# Patient Record
Sex: MALE | Race: WHITE | Employment: FULL TIME | URBAN - METROPOLITAN AREA
[De-identification: names, ages, dates, MRNs, and addresses within clinical notes are randomized per-mention and may not be internally consistent; named-entity substitution may affect disease eponyms.]

---

## 2017-04-07 PROBLEM — Z72.51 HIGH RISK SEXUAL BEHAVIOR: Status: ACTIVE | Noted: 2017-04-07

## 2017-05-08 PROCEDURE — 86694 HERPES SIMPLEX NES ANTBDY: CPT | Performed by: UROLOGY

## 2017-05-08 PROCEDURE — 36415 COLL VENOUS BLD VENIPUNCTURE: CPT | Performed by: UROLOGY

## 2017-05-08 PROCEDURE — 86695 HERPES SIMPLEX TYPE 1 TEST: CPT | Performed by: UROLOGY

## 2017-05-08 PROCEDURE — 86696 HERPES SIMPLEX TYPE 2 TEST: CPT | Performed by: UROLOGY

## 2017-05-08 PROCEDURE — 87389 HIV-1 AG W/HIV-1&-2 AB AG IA: CPT | Performed by: UROLOGY

## 2017-05-23 PROBLEM — J30.1 SEASONAL ALLERGIC RHINITIS DUE TO POLLEN: Status: ACTIVE | Noted: 2017-05-23

## 2017-05-23 PROBLEM — R09.82 POST-NASAL DRAINAGE: Status: ACTIVE | Noted: 2017-05-23

## 2017-06-01 PROCEDURE — 36415 COLL VENOUS BLD VENIPUNCTURE: CPT | Performed by: UROLOGY

## 2017-06-01 PROCEDURE — 87389 HIV-1 AG W/HIV-1&-2 AB AG IA: CPT | Performed by: UROLOGY

## 2017-07-14 PROBLEM — K14.6 BURNING TONGUE: Status: ACTIVE | Noted: 2017-07-14

## 2017-07-27 PROCEDURE — 87389 HIV-1 AG W/HIV-1&-2 AB AG IA: CPT | Performed by: INTERNAL MEDICINE

## 2017-07-27 PROCEDURE — 36415 COLL VENOUS BLD VENIPUNCTURE: CPT | Performed by: INTERNAL MEDICINE

## 2019-05-02 ENCOUNTER — OFFICE VISIT (OUTPATIENT)
Dept: SURGERY | Facility: CLINIC | Age: 34
End: 2019-05-02
Payer: COMMERCIAL

## 2019-05-02 VITALS
HEART RATE: 87 BPM | SYSTOLIC BLOOD PRESSURE: 150 MMHG | BODY MASS INDEX: 25 KG/M2 | WEIGHT: 192 LBS | DIASTOLIC BLOOD PRESSURE: 85 MMHG

## 2019-05-02 DIAGNOSIS — L29.0 PRURITUS ANI: ICD-10-CM

## 2019-05-02 DIAGNOSIS — K64.2 GRADE III INTERNAL HEMORRHOIDS: Primary | ICD-10-CM

## 2019-05-02 DIAGNOSIS — K51.919 ULCERATIVE COLITIS WITH COMPLICATION, UNSPECIFIED LOCATION (HCC): ICD-10-CM

## 2019-05-02 PROCEDURE — 99244 OFF/OP CNSLTJ NEW/EST MOD 40: CPT | Performed by: COLON & RECTAL SURGERY

## 2019-05-02 PROCEDURE — 46600 DIAGNOSTIC ANOSCOPY SPX: CPT | Performed by: COLON & RECTAL SURGERY

## 2019-05-02 NOTE — PATIENT INSTRUCTIONS
The patient presents today referred by Dr. Stephanie Jett for approximately 1 year history of rectal itching and pain.     The patient states that approximately 1 year ago the patient began having on and off itching, episodes of bleeding after bowel movements on th discussed with the patient and it was emphasized that he should avoid any coffee, cola, beer, spicy foods, or tomato products. All risks, benefits, complications and alternatives to the proposed operation were fully discussed with the patient.  All quest

## 2019-05-02 NOTE — H&P
New Patient Visit Note       Active Problems      1. Grade III internal hemorrhoids    2. Ulcerative colitis with complication, unspecified location ([facility] [address]    3.  Pruritus ani        Chief Complaint   Patient presents with:  Hemorrhoids: NP HEMORRHOIDS- appe my own physical exam and obtained the history as detailed above.   I have made all appropriate changes in documentation as necessary  I attest to the accuracy of this note as detailed above    MD Maria Fernanda Gautam. Sabrina 46    My total face time with this MG Oral Tablet 24 Hr, TAKE 2 TABLETS BY MOUTH EVERY MORNING, Disp: , Rfl: 6  •  LIALDA 1.2 g Oral Tab EC, TAKE 2 TABLETS BY MOUTH EVERY DAY ** NEEDS TO SEE MD FOR FURTHER REFILLS, Disp: 60 tablet, Rfl: 0  •  FOLIC ACID 1 MG Oral Tab, TAKE 1 TABLET BY MOUTH Trachea normal and normal range of motion. Carotid bruit is not present. No tracheal deviation present. No thyroid mass and no thyromegaly present. Cardiovascular: Normal rate, regular rhythm, S1 normal, S2 normal and normal heart sounds.    No murmur hea and no posterior cervical adenopathy present. Right: No inguinal and no supraclavicular adenopathy present. Left: No inguinal and no supraclavicular adenopathy present. Neurological: He is alert and oriented to person, place, and time.    Sk lichenification. There are no external hemorrhoids, fissures, or ulcerations. Digital rectal exam the patient is a normal size prostate. The patient has circumferential grade 3 internal hemorrhoids.   There is no evidence of any proctitis, ulcerative col

## 2019-05-02 NOTE — PROCEDURES
Procedure:  Anoscopy    Surgeon: Anastasia Bran    Anesthesia: None    Findings: See the progress note attached for all findings    Operative Summary: The patient was placed in a prone position on the proctoscopy table, the hips were flexed in the jackknife p

## 2019-05-20 ENCOUNTER — OFFICE VISIT (OUTPATIENT)
Dept: SURGERY | Facility: CLINIC | Age: 34
End: 2019-05-20
Payer: COMMERCIAL

## 2019-05-20 VITALS
SYSTOLIC BLOOD PRESSURE: 148 MMHG | WEIGHT: 192 LBS | BODY MASS INDEX: 25.45 KG/M2 | TEMPERATURE: 98 F | HEART RATE: 78 BPM | HEIGHT: 73 IN | DIASTOLIC BLOOD PRESSURE: 87 MMHG

## 2019-05-20 DIAGNOSIS — K64.2 GRADE III INTERNAL HEMORRHOIDS: Primary | ICD-10-CM

## 2019-05-20 PROCEDURE — 46221 LIGATION OF HEMORRHOID(S): CPT | Performed by: COLON & RECTAL SURGERY

## 2019-05-20 NOTE — PROCEDURES
Pre op diagnosis: Internal Hemorrhoids    Post op diagnosis: Same    Procedure: Anoscopy with O-ring Rubber Band Ligation of Internal Hemorrhoids    Surgeon: Michelle Rincon MD    History of present illness:    This patient has internal hemorrhoids that are s

## 2019-05-20 NOTE — PATIENT INSTRUCTIONS
At today's office visit we treated right ethan-lateral internal hemorrhoid. At today's visit, the patient underwent an uncomplicated application of a rubber band and injection of a 5% phenol solution into the base of the rubberbanded hemorrhoid.     The

## 2019-05-20 NOTE — PROGRESS NOTES
Follow Up Visit Note       Active Problems      1. Grade III internal hemorrhoids          Chief Complaint   Patient presents with:  Hemorrhoids: 1ST BANDING        History of Present Illness        Allergies  Evie Bryson is allergic to no known allergies.     Pas FOLIC ACID 1 MG Oral Tab, TAKE 1 TABLET BY MOUTH EVERY DAY, Disp: 30 tablet, Rfl: 2  •  OMEPRAZOLE 40 MG Oral Capsule Delayed Release, TAKE 1 CAPSULE (40 MG TOTAL) BY MOUTH DAILY.  TAKE 1/2 HOUR BEFORE BREAKFAST, Disp: 30 capsule, Rfl: 0  •  Levocetirizine recover in my office for a few minutes prior to leaving for home. The patient should refrain from extreme sports or athletic activity, including heavy lifting. We will see this patient again in 2-3 weeks for further care and treatment.            No o

## 2019-06-03 ENCOUNTER — OFFICE VISIT (OUTPATIENT)
Dept: SURGERY | Facility: CLINIC | Age: 34
End: 2019-06-03
Payer: COMMERCIAL

## 2019-06-03 VITALS
SYSTOLIC BLOOD PRESSURE: 136 MMHG | WEIGHT: 185 LBS | HEART RATE: 82 BPM | DIASTOLIC BLOOD PRESSURE: 85 MMHG | HEIGHT: 73 IN | TEMPERATURE: 98 F | BODY MASS INDEX: 24.52 KG/M2

## 2019-06-03 DIAGNOSIS — K64.2 GRADE III INTERNAL HEMORRHOIDS: Primary | ICD-10-CM

## 2019-06-03 PROCEDURE — 46221 LIGATION OF HEMORRHOID(S): CPT | Performed by: COLON & RECTAL SURGERY

## 2019-06-03 NOTE — PROGRESS NOTES
Follow Up Visit Note       Active Problems      1. Grade III internal hemorrhoids          Chief Complaint   Patient presents with:  Hemorrhoid BandinND BANDING-- Denies constipation/diahrrea, n/v, fever or chills. Denies rectal bleeding after BMs.  Benita Tyler MORNING, Disp: , Rfl: 6  •  LIALDA 1.2 g Oral Tab EC, TAKE 2 TABLETS BY MOUTH EVERY DAY ** NEEDS TO SEE MD FOR FURTHER REFILLS, Disp: 60 tablet, Rfl: 0  •  FOLIC ACID 1 MG Oral Tab, TAKE 1 TABLET BY MOUTH EVERY DAY, Disp: 30 tablet, Rfl: 2  •  OMEPRAZOLE 4 hemorrhoid. The patient tolerated the procedure well. The patient remained stable throughout the entire procedure within my office. The patient was asked to recover in my office for a few minutes prior to leaving for home.     The patient should refr

## 2019-06-03 NOTE — PROCEDURES
Pre op diagnosis: Internal Hemorrhoids    Post op diagnosis: Same    Procedure: Anoscopy with O-ring Rubber Band Ligation of Internal Hemorrhoids    Surgeon: Enzo Morris MD    History of present illness:    This patient has internal hemorrhoids that are s

## 2020-02-17 PROBLEM — K51.90 ULCERATIVE COLITIS, UNSPECIFIED, WITHOUT COMPLICATIONS (HCC): Status: ACTIVE | Noted: 2020-02-17

## 2022-05-31 PROBLEM — Z72.51 HIGH RISK SEXUAL BEHAVIOR: Status: RESOLVED | Noted: 2017-04-07 | Resolved: 2022-05-31

## 2022-06-15 NOTE — LETTER
19    Patient: Andrea Benavides  : 1985 Visit date: 2019    Dear  Dr. Lanre Watts MD,    Thank you for referring Adriánvidal Troncosomarychuy to my practice. Please find my assessment and plan below.          Assessment   Grade III internal hemorrhoids  (p
175.26

## 2022-07-10 ENCOUNTER — APPOINTMENT (OUTPATIENT)
Dept: GENERAL RADIOLOGY | Facility: HOSPITAL | Age: 37
End: 2022-07-10
Attending: EMERGENCY MEDICINE
Payer: COMMERCIAL

## 2022-07-10 ENCOUNTER — HOSPITAL ENCOUNTER (EMERGENCY)
Facility: HOSPITAL | Age: 37
Discharge: HOME | End: 2022-07-10
Attending: EMERGENCY MEDICINE
Payer: COMMERCIAL

## 2022-07-10 VITALS
DIASTOLIC BLOOD PRESSURE: 97 MMHG | TEMPERATURE: 99 F | HEART RATE: 97 BPM | HEIGHT: 73 IN | SYSTOLIC BLOOD PRESSURE: 145 MMHG | RESPIRATION RATE: 16 BRPM | BODY MASS INDEX: 25.18 KG/M2 | OXYGEN SATURATION: 98 % | WEIGHT: 190 LBS

## 2022-07-10 DIAGNOSIS — K62.89 RECTAL IRRITATION: Primary | ICD-10-CM

## 2022-07-10 LAB
ALBUMIN SERPL-MCNC: 3.8 G/DL (ref 3.4–5)
ALBUMIN/GLOB SERPL: 1.1 {RATIO} (ref 1–2)
ALP LIVER SERPL-CCNC: 61 U/L
ALT SERPL-CCNC: 18 U/L
ANION GAP SERPL CALC-SCNC: 7 MMOL/L (ref 0–18)
AST SERPL-CCNC: 17 U/L (ref 15–37)
BASOPHILS # BLD AUTO: 0.07 X10(3) UL (ref 0–0.2)
BASOPHILS NFR BLD AUTO: 0.9 %
BILIRUB SERPL-MCNC: 0.5 MG/DL (ref 0.1–2)
BILIRUB UR QL STRIP.AUTO: NEGATIVE
BUN BLD-MCNC: 17 MG/DL (ref 7–18)
CALCIUM BLD-MCNC: 8.8 MG/DL (ref 8.5–10.1)
CHLORIDE SERPL-SCNC: 106 MMOL/L (ref 98–112)
CLARITY UR REFRACT.AUTO: CLEAR
CO2 SERPL-SCNC: 29 MMOL/L (ref 21–32)
COLOR UR AUTO: YELLOW
CREAT BLD-MCNC: 1.37 MG/DL
EOSINOPHIL # BLD AUTO: 0.48 X10(3) UL (ref 0–0.7)
EOSINOPHIL NFR BLD AUTO: 6.3 %
ERYTHROCYTE [DISTWIDTH] IN BLOOD BY AUTOMATED COUNT: 12.1 %
GLOBULIN PLAS-MCNC: 3.4 G/DL (ref 2.8–4.4)
GLUCOSE BLD-MCNC: 108 MG/DL (ref 70–99)
GLUCOSE UR STRIP.AUTO-MCNC: NEGATIVE MG/DL
HCT VFR BLD AUTO: 45.4 %
HGB BLD-MCNC: 15.6 G/DL
IMM GRANULOCYTES # BLD AUTO: 0.01 X10(3) UL (ref 0–1)
IMM GRANULOCYTES NFR BLD: 0.1 %
LEUKOCYTE ESTERASE UR QL STRIP.AUTO: NEGATIVE
LYMPHOCYTES # BLD AUTO: 3.49 X10(3) UL (ref 1–4)
LYMPHOCYTES NFR BLD AUTO: 46 %
MCH RBC QN AUTO: 30.8 PG (ref 26–34)
MCHC RBC AUTO-ENTMCNC: 34.4 G/DL (ref 31–37)
MCV RBC AUTO: 89.7 FL
MONOCYTES # BLD AUTO: 0.61 X10(3) UL (ref 0.1–1)
MONOCYTES NFR BLD AUTO: 8 %
NEUTROPHILS # BLD AUTO: 2.93 X10 (3) UL (ref 1.5–7.7)
NEUTROPHILS # BLD AUTO: 2.93 X10(3) UL (ref 1.5–7.7)
NEUTROPHILS NFR BLD AUTO: 38.7 %
NITRITE UR QL STRIP.AUTO: NEGATIVE
OSMOLALITY SERPL CALC.SUM OF ELEC: 296 MOSM/KG (ref 275–295)
PH UR STRIP.AUTO: 7 [PH] (ref 5–8)
PLATELET # BLD AUTO: 205 10(3)UL (ref 150–450)
POTASSIUM SERPL-SCNC: 3.5 MMOL/L (ref 3.5–5.1)
PROT SERPL-MCNC: 7.2 G/DL (ref 6.4–8.2)
PROT UR STRIP.AUTO-MCNC: NEGATIVE MG/DL
RBC # BLD AUTO: 5.06 X10(6)UL
RBC UR QL AUTO: NEGATIVE
SODIUM SERPL-SCNC: 142 MMOL/L (ref 136–145)
SP GR UR STRIP.AUTO: 1.02 (ref 1–1.03)
UROBILINOGEN UR STRIP.AUTO-MCNC: 0.2 MG/DL
WBC # BLD AUTO: 7.6 X10(3) UL (ref 4–11)

## 2022-07-10 PROCEDURE — 99283 EMERGENCY DEPT VISIT LOW MDM: CPT

## 2022-07-10 PROCEDURE — 80053 COMPREHEN METABOLIC PANEL: CPT | Performed by: EMERGENCY MEDICINE

## 2022-07-10 PROCEDURE — 74019 RADEX ABDOMEN 2 VIEWS: CPT | Performed by: EMERGENCY MEDICINE

## 2022-07-10 PROCEDURE — 99284 EMERGENCY DEPT VISIT MOD MDM: CPT

## 2022-07-10 PROCEDURE — 36415 COLL VENOUS BLD VENIPUNCTURE: CPT

## 2022-07-10 PROCEDURE — 85025 COMPLETE CBC W/AUTO DIFF WBC: CPT | Performed by: EMERGENCY MEDICINE

## 2022-07-10 PROCEDURE — 81003 URINALYSIS AUTO W/O SCOPE: CPT | Performed by: EMERGENCY MEDICINE

## 2022-07-10 PROCEDURE — 82272 OCCULT BLD FECES 1-3 TESTS: CPT

## 2022-07-11 ENCOUNTER — LAB ENCOUNTER (OUTPATIENT)
Dept: LAB | Facility: HOSPITAL | Age: 37
End: 2022-07-11
Attending: EMERGENCY MEDICINE
Payer: COMMERCIAL

## 2022-07-11 LAB — C DIFF TOX B STL QL: NEGATIVE

## 2022-07-11 PROCEDURE — 87177 OVA AND PARASITES SMEARS: CPT | Performed by: EMERGENCY MEDICINE

## 2022-07-11 PROCEDURE — 87045 FECES CULTURE AEROBIC BACT: CPT | Performed by: EMERGENCY MEDICINE

## 2022-07-11 PROCEDURE — 87209 SMEAR COMPLEX STAIN: CPT | Performed by: EMERGENCY MEDICINE

## 2022-07-11 PROCEDURE — 87046 STOOL CULTR AEROBIC BACT EA: CPT | Performed by: EMERGENCY MEDICINE

## 2022-07-11 PROCEDURE — 87427 SHIGA-LIKE TOXIN AG IA: CPT | Performed by: EMERGENCY MEDICINE

## 2022-07-11 PROCEDURE — 82272 OCCULT BLD FECES 1-3 TESTS: CPT | Performed by: EMERGENCY MEDICINE

## 2022-07-11 PROCEDURE — 87493 C DIFF AMPLIFIED PROBE: CPT | Performed by: EMERGENCY MEDICINE

## 2022-07-11 NOTE — ED INITIAL ASSESSMENT (HPI)
1 week of rectal irritation & nocturnal itching in rectal area. Intermittent low abdominal pain & diarrhea. Tonight noted \"moving something in my stool\". Denies any recent travel. Concerned of possible internal hemorrhoids.

## 2022-07-18 LAB — OVA AND PARASITE, FECAL INTERPRETATION: NEGATIVE

## 2022-12-14 ENCOUNTER — HOSPITAL ENCOUNTER (OUTPATIENT)
Age: 37
Discharge: HOME | End: 2022-12-14
Payer: COMMERCIAL

## 2022-12-14 VITALS
SYSTOLIC BLOOD PRESSURE: 127 MMHG | HEART RATE: 90 BPM | TEMPERATURE: 98 F | HEIGHT: 73 IN | BODY MASS INDEX: 25.18 KG/M2 | WEIGHT: 190 LBS | OXYGEN SATURATION: 97 % | RESPIRATION RATE: 18 BRPM | DIASTOLIC BLOOD PRESSURE: 95 MMHG

## 2022-12-14 DIAGNOSIS — B34.9 VIRAL SYNDROME: Primary | ICD-10-CM

## 2022-12-14 LAB
POCT INFLUENZA A: NEGATIVE
POCT INFLUENZA B: NEGATIVE
S PYO AG THROAT QL: NEGATIVE
SARS-COV-2 RNA RESP QL NAA+PROBE: NOT DETECTED

## 2022-12-14 PROCEDURE — 87502 INFLUENZA DNA AMP PROBE: CPT | Performed by: NURSE PRACTITIONER

## 2022-12-14 PROCEDURE — 87880 STREP A ASSAY W/OPTIC: CPT | Performed by: NURSE PRACTITIONER

## 2022-12-14 PROCEDURE — 99203 OFFICE O/P NEW LOW 30 MIN: CPT | Performed by: NURSE PRACTITIONER

## 2022-12-14 PROCEDURE — U0002 COVID-19 LAB TEST NON-CDC: HCPCS | Performed by: NURSE PRACTITIONER

## 2022-12-14 RX ORDER — PREDNISONE 20 MG/1
40 TABLET ORAL DAILY
Qty: 10 TABLET | Refills: 0 | Status: SHIPPED | OUTPATIENT
Start: 2022-12-14 | End: 2022-12-19

## 2022-12-14 NOTE — DISCHARGE INSTRUCTIONS
Take steroids as directed. Take Mucinex as needed for congestion. Tylenol and Motrin as needed for pain.

## 2023-01-02 ENCOUNTER — LAB ENCOUNTER (OUTPATIENT)
Dept: LAB | Facility: HOSPITAL | Age: 38
End: 2023-01-02
Payer: COMMERCIAL

## 2023-01-02 PROCEDURE — 87081 CULTURE SCREEN ONLY: CPT

## 2023-01-03 ENCOUNTER — LAB ENCOUNTER (OUTPATIENT)
Dept: LAB | Facility: HOSPITAL | Age: 38
End: 2023-01-03
Payer: COMMERCIAL

## 2023-01-03 PROCEDURE — 87081 CULTURE SCREEN ONLY: CPT

## 2023-01-04 ENCOUNTER — LAB ENCOUNTER (OUTPATIENT)
Dept: LAB | Facility: HOSPITAL | Age: 38
End: 2023-01-04
Payer: COMMERCIAL

## 2023-01-04 PROCEDURE — 87081 CULTURE SCREEN ONLY: CPT

## 2023-04-22 ENCOUNTER — HOSPITAL ENCOUNTER (OUTPATIENT)
Age: 38
Discharge: HOME | End: 2023-04-22
Payer: COMMERCIAL

## 2023-04-22 VITALS
OXYGEN SATURATION: 98 % | SYSTOLIC BLOOD PRESSURE: 143 MMHG | WEIGHT: 190 LBS | HEIGHT: 74 IN | HEART RATE: 78 BPM | RESPIRATION RATE: 20 BRPM | DIASTOLIC BLOOD PRESSURE: 78 MMHG | TEMPERATURE: 99 F | BODY MASS INDEX: 24.38 KG/M2

## 2023-04-22 DIAGNOSIS — J40 BRONCHITIS: Primary | ICD-10-CM

## 2023-04-22 LAB
S PYO AG THROAT QL: NEGATIVE
SARS-COV-2 RNA RESP QL NAA+PROBE: NOT DETECTED

## 2023-04-22 RX ORDER — ALBUTEROL SULFATE 90 UG/1
2 AEROSOL, METERED RESPIRATORY (INHALATION) EVERY 4 HOURS PRN
Qty: 1 EACH | Refills: 0 | Status: SHIPPED | OUTPATIENT
Start: 2023-04-22 | End: 2023-05-22

## 2023-04-22 RX ORDER — PREDNISONE 20 MG/1
40 TABLET ORAL ONCE
Status: COMPLETED | OUTPATIENT
Start: 2023-04-22 | End: 2023-04-22

## 2023-04-22 RX ORDER — PREDNISONE 20 MG/1
40 TABLET ORAL DAILY
Qty: 8 TABLET | Refills: 0 | Status: SHIPPED | OUTPATIENT
Start: 2023-04-23 | End: 2023-04-27

## 2023-04-22 NOTE — DISCHARGE INSTRUCTIONS
Start prednisone tomorrow as you took a dose here today. Use albuterol as needed. Mucinex or Sudafed as needed for congestion. Follow-up with your primary care doctor as needed. Return with any new or worsening symptoms.

## 2023-04-22 NOTE — ED INITIAL ASSESSMENT (HPI)
Pt has had a fever, sore throat , cough and congestion for the past 3 days.   He has asthma , and is cough is getting worse,

## 2023-08-18 PROBLEM — R19.4 CHANGE IN BOWEL HABITS: Status: ACTIVE | Noted: 2023-08-18

## 2023-08-18 PROBLEM — K51.00 ULCERATIVE CHRONIC PANCOLITIS WITHOUT COMPLICATIONS (HCC): Status: ACTIVE | Noted: 2023-08-18

## 2023-09-16 ENCOUNTER — OFFICE VISIT (OUTPATIENT)
Dept: FAMILY MEDICINE CLINIC | Facility: CLINIC | Age: 38
End: 2023-09-16
Payer: COMMERCIAL

## 2023-09-16 VITALS
DIASTOLIC BLOOD PRESSURE: 79 MMHG | SYSTOLIC BLOOD PRESSURE: 129 MMHG | RESPIRATION RATE: 18 BRPM | TEMPERATURE: 98 F | HEART RATE: 88 BPM | OXYGEN SATURATION: 97 %

## 2023-09-16 DIAGNOSIS — J01.00 ACUTE NON-RECURRENT MAXILLARY SINUSITIS: Primary | ICD-10-CM

## 2023-09-16 PROCEDURE — 3078F DIAST BP <80 MM HG: CPT | Performed by: NURSE PRACTITIONER

## 2023-09-16 PROCEDURE — 3074F SYST BP LT 130 MM HG: CPT | Performed by: NURSE PRACTITIONER

## 2023-09-16 PROCEDURE — 99213 OFFICE O/P EST LOW 20 MIN: CPT | Performed by: NURSE PRACTITIONER

## 2023-09-16 RX ORDER — PROPRANOLOL HYDROCHLORIDE 10 MG/1
10 TABLET ORAL DAILY PRN
COMMUNITY
Start: 2023-06-30

## 2023-09-16 RX ORDER — AMOXICILLIN AND CLAVULANATE POTASSIUM 875; 125 MG/1; MG/1
1 TABLET, FILM COATED ORAL 2 TIMES DAILY
Qty: 14 TABLET | Refills: 0 | Status: SHIPPED | OUTPATIENT
Start: 2023-09-16 | End: 2023-09-23

## 2023-12-29 ENCOUNTER — HOSPITAL ENCOUNTER (OUTPATIENT)
Age: 38
Discharge: HOME | End: 2023-12-29
Payer: COMMERCIAL

## 2023-12-29 ENCOUNTER — APPOINTMENT (OUTPATIENT)
Dept: GENERAL RADIOLOGY | Age: 38
End: 2023-12-29
Attending: NURSE PRACTITIONER
Payer: COMMERCIAL

## 2023-12-29 VITALS
SYSTOLIC BLOOD PRESSURE: 127 MMHG | TEMPERATURE: 98 F | DIASTOLIC BLOOD PRESSURE: 95 MMHG | RESPIRATION RATE: 17 BRPM | HEART RATE: 74 BPM | OXYGEN SATURATION: 97 %

## 2023-12-29 DIAGNOSIS — J01.90 ACUTE SINUSITIS, RECURRENCE NOT SPECIFIED, UNSPECIFIED LOCATION: Primary | ICD-10-CM

## 2023-12-29 LAB — DDIMER WHOLE BLOOD: <200 NG/ML DDU (ref ?–400)

## 2023-12-29 PROCEDURE — 99213 OFFICE O/P EST LOW 20 MIN: CPT | Performed by: NURSE PRACTITIONER

## 2023-12-29 PROCEDURE — 71046 X-RAY EXAM CHEST 2 VIEWS: CPT | Performed by: NURSE PRACTITIONER

## 2023-12-29 PROCEDURE — 85378 FIBRIN DEGRADE SEMIQUANT: CPT | Performed by: NURSE PRACTITIONER

## 2023-12-29 RX ORDER — ALBUTEROL SULFATE 90 UG/1
2 AEROSOL, METERED RESPIRATORY (INHALATION) EVERY 4 HOURS PRN
Qty: 1 EACH | Refills: 0 | Status: SHIPPED | OUTPATIENT
Start: 2023-12-29 | End: 2024-01-28

## 2023-12-29 RX ORDER — BENZONATATE 100 MG/1
200 CAPSULE ORAL 3 TIMES DAILY PRN
Qty: 30 CAPSULE | Refills: 0 | Status: SHIPPED | OUTPATIENT
Start: 2023-12-29 | End: 2024-01-28

## 2023-12-29 RX ORDER — DOXYCYCLINE HYCLATE 100 MG/1
100 CAPSULE ORAL 2 TIMES DAILY
Qty: 20 CAPSULE | Refills: 0 | Status: SHIPPED | OUTPATIENT
Start: 2023-12-29 | End: 2024-01-08

## 2023-12-29 NOTE — DISCHARGE INSTRUCTIONS
Push fluids. Rest.  Tylenol or ibuprofen as needed for pain or fever. Take the prescribed medications as directed. Follow-up with your primary care doctor. Return for any concerns.

## 2024-03-20 ENCOUNTER — OFFICE VISIT (OUTPATIENT)
Dept: FAMILY MEDICINE CLINIC | Facility: CLINIC | Age: 39
End: 2024-03-20
Payer: COMMERCIAL

## 2024-03-20 VITALS
HEART RATE: 83 BPM | OXYGEN SATURATION: 98 % | TEMPERATURE: 98 F | RESPIRATION RATE: 18 BRPM | SYSTOLIC BLOOD PRESSURE: 118 MMHG | HEIGHT: 73 IN | WEIGHT: 200.63 LBS | BODY MASS INDEX: 26.59 KG/M2 | DIASTOLIC BLOOD PRESSURE: 70 MMHG

## 2024-03-20 DIAGNOSIS — Z02.89 ENCOUNTER FOR PHYSICAL EXAMINATION RELATED TO EMPLOYMENT: Primary | ICD-10-CM

## 2024-03-20 PROCEDURE — 3074F SYST BP LT 130 MM HG: CPT | Performed by: NURSE PRACTITIONER

## 2024-03-20 PROCEDURE — 3078F DIAST BP <80 MM HG: CPT | Performed by: NURSE PRACTITIONER

## 2024-03-20 PROCEDURE — 3008F BODY MASS INDEX DOCD: CPT | Performed by: NURSE PRACTITIONER

## 2024-03-20 PROCEDURE — 99396 PREV VISIT EST AGE 40-64: CPT | Performed by: NURSE PRACTITIONER

## 2024-03-20 RX ORDER — BUDESONIDE 0.5 MG/2ML
INHALANT ORAL
COMMUNITY
Start: 2023-10-07

## 2024-03-20 RX ORDER — IPRATROPIUM BROMIDE AND ALBUTEROL SULFATE 2.5; .5 MG/3ML; MG/3ML
SOLUTION RESPIRATORY (INHALATION) 3 TIMES DAILY PRN
COMMUNITY
Start: 2023-10-07

## 2024-03-20 NOTE — PROGRESS NOTES
CHIEF COMPLAINT:     Chief Complaint   Patient presents with    Physical       HPI:   Rowdy Victoria is a 39 year old male who presents for a physical exam for employment - . 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  Patient has no health concerns.    Denies SI/HI.  H/o anxiety/depression.  Controlled with meds.    Current Outpatient Medications   Medication Sig Dispense Refill    propranolol 10 MG Oral Tab Take 1 tablet (10 mg total) by mouth daily as needed. (Patient not taking: Reported on 2023)      MESALAMINE 1.2 g Oral Tab EC TAKE 2 TABLETS(2.4 GRAMS) BY MOUTH DAILY 180 tablet 3    LORazepam 0.5 MG Oral Tab Take 1 tablet (0.5 mg total) by mouth every 4 (four) hours as needed for Anxiety.      divalproex Sodium  MG Oral Tablet 24 Hr TAKE 2 TABLETS BY MOUTH EVERY MORNING  6      Past Medical History:   Diagnosis Date    ALLERGIC RHINITIS     ASTHMA     Irregular bowel habits     OTHER DISEASES     Bipolar disorder    UC (ulcerative colitis) ([facility])     Wears glasses       Past Surgical History:   Procedure Laterality Date    COLONOSCOPY      CREATE EARDRUM OPENING,GEN ANESTH      TONSILLECTOMY        Family History   Problem Relation Age of Onset    Psychiatric Father         Bipolar    Colon Polyps Mother     Stroke Mother     Crohn's Disease Maternal Grandmother       Social History:  Social History     Socioeconomic History    Marital status:    Tobacco Use    Smoking status: Former     Packs/day: 1.00     Years: 3.00     Additional pack years: 0.00     Total pack years: 3.00     Types: Cigarettes     Quit date: 2002     Years since quittin.2     Passive exposure: Never    Smokeless tobacco: Never    Tobacco comments:     social smoker   Vaping Use    Vaping Use: Never used   Substance and Sexual Activity    Alcohol use: Yes    Drug use: No   Other Topics Concern    Seat Belt Yes         REVIEW OF SYSTEMS:   GENERAL: Feels well otherwise  SKIN: denies any unusual skin  lesions  EYES:denies blurred vision or double vision  HEENT: denies nasal congestion, sinus pain or ST  LUNGS: denies shortness of breath at rest on on exertion  CARDIOVASCULAR: denies chest pain or palpitations   GI: denies abdominal pain or heartburn.  No N/V/C/D.  : denies vaginal discharge, dysuria, suprapubic pain.   MUSCULOSKELETAL: denies back pain or other joint pain  NEURO: denies headaches  PSYCHE: + hx of depression or anxiety. Controlled.  On meds.  HEMATOLOGIC: denies hx of anemia or other bleeding disorders  ENDOCRINE: denies thyroid history  ALLERGY/ASTHMA: denies hx of seasonal allergies or asthma    EXAM:   /70   Pulse 83   Temp 98 °F (36.7 °C) (Oral)   Resp 18   Ht 6' 1\" (1.854 m)   Wt 200 lb 9.6 oz (91 kg)   SpO2 98%   BMI 26.47 kg/m²   Body mass index is 26.47 kg/m².     Visual Acuity     Vision Screen Test Type: Snellen Wall Chart    Right Eye Visual Acuity: Corrected Right Eye Chart Acuity: 20/25   Left Eye Visual Acuity: Corrected Left Eye Chart Acuity: 20/25   Both Eyes Visual Acuity: Corrected Both Eyes Chart Acuity: 20/25     Hearing Screening    Screening Method: Finger Rub  Finger Rub Result: Pass         GENERAL: well developed, well nourished,in no apparent distress  SKIN: no rashes,no suspicious lesions  HEENT: atraumatic, normocephalic,ears and throat are clear  EYES:PERRLA, EOMI, conjunctivae are clear  NECK: supple,no lymphadenopathy.  No thyromegaly or thyroid nodules palpated.  CHEST: no chest tenderness to palpation  LUNGS: Clear to auscultation bilaterally. No diminished breath sounds. No wheezing, rhonchi, or rales.  CARDIO: RRR without murmur  GI: NABS x 4.  No abd tenderness, obvious masses or HSM.  EXTREMITIES: no cyanosis, clubbing or edema  NEURO: Alert & Oriented x3. Cranial nerves are grossly intact. DTRs 2/4 LE b/l.     ASSESSMENT AND PLAN:   Rowdy Victoria is a 39 year old male who presents for a employment physical exam.       ASSESSMENT:    Encounter Diagnosis   Name Primary?    Encounter for physical examination related to employment Yes         PLAN:   TB risk assessment form done.  NO ppd test required for job - 2nd position.      Patient was found free of any mental or physical condition which would prevent him from performing his work.   Cleared for work without restrictions.     Patient instructed to stop any activity that causes SOB/CP, muscle or joint pain, or any other physical problem, and seek medical attention.     Encouraged patient to have full physical with health maintenance labs done by PCP yearly.   Encouraged to exercise and watch dietary habits.    Patient is to follow up as needed with PCP or here in clinic.  Patient verbalizes understanding of instructions.   Form filled out and given to patient.  Form was copied and sent to scanning.

## 2024-04-01 ENCOUNTER — HOSPITAL ENCOUNTER (OUTPATIENT)
Age: 39
Discharge: HOME | End: 2024-04-01
Payer: COMMERCIAL

## 2024-04-01 VITALS
DIASTOLIC BLOOD PRESSURE: 71 MMHG | RESPIRATION RATE: 18 BRPM | HEIGHT: 74 IN | BODY MASS INDEX: 24.9 KG/M2 | OXYGEN SATURATION: 96 % | HEART RATE: 83 BPM | TEMPERATURE: 99 F | WEIGHT: 194 LBS | SYSTOLIC BLOOD PRESSURE: 125 MMHG

## 2024-04-01 DIAGNOSIS — S39.012A STRAIN OF LUMBAR REGION, INITIAL ENCOUNTER: Primary | ICD-10-CM

## 2024-04-01 PROCEDURE — 96372 THER/PROPH/DIAG INJ SC/IM: CPT | Performed by: PHYSICIAN ASSISTANT

## 2024-04-01 PROCEDURE — 99213 OFFICE O/P EST LOW 20 MIN: CPT | Performed by: PHYSICIAN ASSISTANT

## 2024-04-01 RX ORDER — KETOROLAC TROMETHAMINE 30 MG/ML
30 INJECTION, SOLUTION INTRAMUSCULAR; INTRAVENOUS ONCE
Status: COMPLETED | OUTPATIENT
Start: 2024-04-01 | End: 2024-04-01

## 2024-04-01 RX ORDER — TIZANIDINE 4 MG/1
4 TABLET ORAL 3 TIMES DAILY
Qty: 21 TABLET | Refills: 0 | Status: SHIPPED | OUTPATIENT
Start: 2024-04-01

## 2024-04-01 RX ORDER — LIDOCAINE 4 G/G
1 PATCH TOPICAL EVERY 12 HOURS
Qty: 10 PATCH | Refills: 0 | Status: SHIPPED | OUTPATIENT
Start: 2024-04-01

## 2024-04-01 RX ORDER — PREDNISONE 20 MG/1
40 TABLET ORAL DAILY
Qty: 10 TABLET | Refills: 0 | Status: SHIPPED | OUTPATIENT
Start: 2024-04-01 | End: 2024-04-06

## 2024-04-01 RX ORDER — IBUPROFEN 600 MG/1
600 TABLET ORAL EVERY 8 HOURS PRN
Qty: 21 TABLET | Refills: 0 | Status: SHIPPED | OUTPATIENT
Start: 2024-04-01 | End: 2024-04-08

## 2024-04-01 RX ORDER — TRAMADOL HYDROCHLORIDE 50 MG/1
50 TABLET ORAL EVERY 8 HOURS PRN
Qty: 10 TABLET | Refills: 0 | Status: SHIPPED | OUTPATIENT
Start: 2024-04-01

## 2024-04-01 NOTE — ED INITIAL ASSESSMENT (HPI)
Lower back pain since Saturday  Pain from working out  Pressure  Worse when changing position   Better when laying down  Difficulty sleeping due to pain    No loss of bowel or bladder   No pain radiating

## 2024-04-01 NOTE — ED PROVIDER NOTES
Patient Seen in: [facility]      History     Chief Complaint   Patient presents with    Back Pain     Stated Complaint: low back pain    Subjective:   HPI    Rowdy Victoria is a 39 year old male here for evluation of low back pain x 1 days.   Localized and atraumatic in nature.  No reported numbness, tingling, parasthesias, skin, color, temperature, sensory changes, anum/ bladder dysfunction, loss of bowel/ bladder control, saddle anesthesia.  No medications taken prior to arrival.   Pain 6/10 worsened with flexion, extension, weight bearing.   Otherwise patient denies history of cancer, unexplained weight loss, IV drug abuse, systemic complaints.        Objective:   Past Medical History:   Diagnosis Date    ALLERGIC RHINITIS     ASTHMA     Irregular bowel habits     OTHER DISEASES     Bipolar disorder    UC (ulcerative colitis) ([facility])     Wears glasses               Past Surgical History:   Procedure Laterality Date    COLONOSCOPY      CREATE EARDRUM OPENING,GEN ANESTH      TONSILLECTOMY                  Social History     Socioeconomic History    Marital status:    Tobacco Use    Smoking status: Former     Packs/day: 1.00     Years: 3.00     Additional pack years: 0.00     Total pack years: 3.00     Types: Cigarettes     Quit date: 2002     Years since quittin.2     Passive exposure: Never    Smokeless tobacco: Never    Tobacco comments:     social smoker   Vaping Use    Vaping Use: Never used   Substance and Sexual Activity    Alcohol use: Yes    Drug use: No   Other Topics Concern    Seat Belt Yes              Review of Systems   All other systems reviewed and are negative.      Positive for stated complaint: low back pain  Other systems are as noted in HPI.  Constitutional and vital signs reviewed.      All other systems reviewed and negative except as noted above.    Physical Exam     ED Triage Vitals [24 0831]   /71   Pulse 83   Resp 18   Temp 98.8 °F  (37.1 °C)   Temp src Temporal   SpO2 96 %   O2 Device None (Room air)       Current:/71   Pulse 83   Temp 98.8 °F (37.1 °C) (Temporal)   Resp 18   Ht 188 cm (6' 2\")   Wt 88 kg   SpO2 96%   BMI 24.91 kg/m²         Physical Exam  Vitals and nursing note reviewed.   Constitutional:       General: He is not in acute distress.     Appearance: Normal appearance. He is normal weight. He is not ill-appearing, toxic-appearing or diaphoretic.   HENT:      Head: Normocephalic and atraumatic.      Nose: Nose normal.   Eyes:      Extraocular Movements: Extraocular movements intact.      Pupils: Pupils are equal, round, and reactive to light.   Cardiovascular:      Rate and Rhythm: Normal rate.      Pulses: Normal pulses.   Pulmonary:      Effort: Pulmonary effort is normal.      Breath sounds: Normal breath sounds.   Musculoskeletal:         General: Tenderness present. No swelling, deformity or signs of injury. Normal range of motion.      Cervical back: Normal range of motion and neck supple.      Right lower leg: No edema.      Left lower leg: No edema.      Comments: Left sided lumbosacral  paraspinal tenderness to palpation.  No step offs, deformities, crepitus, midline tenderness.  Full back flexion/ extension      Skin:     General: Skin is warm and dry.      Capillary Refill: Capillary refill takes less than 2 seconds.      Coloration: Skin is not jaundiced or pale.      Findings: No bruising, erythema, lesion or rash.   Neurological:      General: No focal deficit present.      Mental Status: He is alert and oriented to person, place, and time. Mental status is at baseline.   Psychiatric:         Mood and Affect: Mood normal.         Behavior: Behavior normal.         Thought Content: Thought content normal.         Judgment: Judgment normal.               ED Course   Labs Reviewed - No data to display  Medications   ketorolac (Toradol) 60 MG/2ML IM injection 30 mg (30 mg Intramuscular Given 4/1/24 0841)                       MDM                                        Medical Decision Making  39 year old male presents with acute onset of left sided lumbosacral strain.  Considerations to include cauda equina versus piriformis syndrome versus pyelonephritis versus nephrolithiasis.  Currently patient denies weakness, saddle anesthesia, bowel/bladder incontinence, hematuria, dysuria, fevers, chills.    Plan   - toradol 30mg IM/ Lidoderm adhesive patches to affected applied now     - Alternate between ice and heat to affected region   - Rx:  ibuprofen 600mg po together q 8 hours/ prn. Zanaflex 4mg po TID. Prednisone 40mg po daily x 5 days. Tramadol 50mg po prn sparingly for breakthrough pain only. Lidoderm adhesive patches to affected region daily/ prn   -Apply for no more than 12 consecutive hours   -Do not apply ice and/ or heat on top of Lidoderm patch   -Do not apply to broken skin   -OTC: Tylenol 1g po q 6 hours/ prn.   - refer to orthopaedics/ PCP as needed  - explained to patient that if symptoms do not improve that an MRI may be warranted however that will be determined at the discretion of follow up care  - RICE:  Alternate between ice and heat 4-5 times daily or as needed for no longer than 5-10 minutes at a time.   When applying ice apply a layer of cloth between skin and cold source in order to prevent tissue irritation  - Return to ED if symptoms worsen                Disposition and Plan     Clinical Impression:  1. Strain of lumbar region, initial encounter         Disposition:  Discharge  4/1/2024  8:47 am    Follow-up:  Nacho Lechuga MD  [address] [place] [address]  [address]  [facility] [address]  [phone]          [facility]  [address] [facility] [address]  [phone]        Son Whitfield MD  [address] [place] [address]  [facility] [address] [phone]                Medications Prescribed:  Discharge Medication List as of 4/1/2024  8:49 AM        START taking these medications     Details   ibuprofen 600 MG Oral Tab Take 1 tablet (600 mg total) by mouth every 8 (eight) hours as needed., Normal, Disp-21 tablet, R-0      predniSONE 20 MG Oral Tab Take 2 tablets (40 mg total) by mouth daily for 5 days., Normal, Disp-10 tablet, R-0      tiZANidine 4 MG Oral Tab Take 1 tablet (4 mg total) by mouth 3 (three) times daily., Normal, Disp-21 tablet, R-0      traMADol 50 MG Oral Tab Take 1 tablet (50 mg total) by mouth every 8 (eight) hours as needed for Pain., Normal, Disp-10 tablet, R-0      lidocaine 4 % External Patch Place 1 patch onto the skin every 12 (twelve) hours., Normal, Disp-10 patch, R-0

## 2024-05-13 ENCOUNTER — HOSPITAL ENCOUNTER (OUTPATIENT)
Age: 39
Discharge: HOME | End: 2024-05-13

## 2024-05-13 VITALS
OXYGEN SATURATION: 98 % | DIASTOLIC BLOOD PRESSURE: 78 MMHG | TEMPERATURE: 98 F | RESPIRATION RATE: 16 BRPM | WEIGHT: 190 LBS | BODY MASS INDEX: 25.18 KG/M2 | HEART RATE: 88 BPM | HEIGHT: 73 IN | SYSTOLIC BLOOD PRESSURE: 120 MMHG

## 2024-05-13 DIAGNOSIS — J02.0 STREPTOCOCCAL SORE THROAT: Primary | ICD-10-CM

## 2024-05-13 LAB — S PYO AG THROAT QL: POSITIVE

## 2024-05-13 PROCEDURE — 87880 STREP A ASSAY W/OPTIC: CPT | Performed by: PHYSICIAN ASSISTANT

## 2024-05-13 PROCEDURE — 99213 OFFICE O/P EST LOW 20 MIN: CPT | Performed by: PHYSICIAN ASSISTANT

## 2024-05-13 RX ORDER — AMOXICILLIN 500 MG/1
500 TABLET, FILM COATED ORAL 2 TIMES DAILY
Qty: 20 TABLET | Refills: 0 | Status: SHIPPED | OUTPATIENT
Start: 2024-05-13 | End: 2024-05-23

## 2024-05-13 NOTE — ED INITIAL ASSESSMENT (HPI)
Pt began 2 days ago with a sore throat and headache  his jacqueline with strep last week.  No fever, but fatigued

## 2024-05-13 NOTE — ED PROVIDER NOTES
Chief Complaint   Patient presents with    Sore Throat       HPI:     Rowdy Victoria is a 39 year old male with a history of ulcerative colitis presents to immediate care for evaluation 2-day history of sore throat and headache.  Patient denies any fevers.  States his son was recently diagnosed with strep throat.  No difficulty swallowing.  Has been taking Tylenol and ibuprofen at home.      PFSH    PFSH asessment screens reviewed  Nurses notes reviewed    Family History   Problem Relation Age of Onset    Psychiatric Father         Bipolar    Colon Polyps Mother     Stroke Mother     Crohn's Disease Maternal Grandmother        Social History     Socioeconomic History    Marital status:      Spouse name: Not on file    Number of children: Not on file    Years of education: Not on file    Highest education level: Not on file   Occupational History    Not on file   Tobacco Use    Smoking status: Former     Current packs/day: 0.00     Average packs/day: 1 pack/day for 3.0 years (3.0 ttl pk-yrs)     Types: Cigarettes     Start date: 1999     Quit date: 2002     Years since quittin.3     Passive exposure: Never    Smokeless tobacco: Never    Tobacco comments:     social smoker   Vaping Use    Vaping status: Never Used   Substance and Sexual Activity    Alcohol use: Yes    Drug use: No    Sexual activity: Not on file   Other Topics Concern     Service Not Asked    Blood Transfusions Not Asked    Caffeine Concern Not Asked    Occupational Exposure Not Asked    Hobby Hazards Not Asked    Sleep Concern Not Asked    Stress Concern Not Asked    Weight Concern Not Asked    Special Diet Not Asked    Back Care Not Asked    Exercise Not Asked    Bike Helmet Not Asked    Seat Belt Yes    Self-Exams Not Asked   Social History Narrative    Not on file     Social Determinants of Health     Financial Resource Strain: Not on file   Food Insecurity: Not on file   Transportation Needs: Not on file    Physical Activity: Not on file   Stress: Not on file   Social Connections: Not on file   Housing Stability: Not on file         ROS:   Positive for stated complaint  All other systems reviewed and negative except as noted above.  Constitutional and Vital Signs Reviewed.      Physical Exam:     Findings:    /78   Pulse 88   Temp 98.1 °F (36.7 °C) (Temporal)   Resp 16   Ht 185.4 cm (6' 1\")   Wt 86.2 kg   SpO2 98%   BMI 25.07 kg/m²   GENERAL: alert, normal appearance, no distress.                 HEAD: Normocephalic, atraumatic.     EYES: Conjunctiva without injection, EOMs intact.                        EARS: External ears normal.                  NOSE: Normal external appearance.                   MOUTH: Moist mucous membranes oropharynx patent and moist, erythematous without exudate.  No evidence peritonsillar abscess.  No trismus, normal phonation                      NECK: supple.             CARDIO: Regular rate and rhythm              LUNGS: No respiratory distress, nonlabored respirations.  Lungs clear bilaterally           MSK: Normal rom.     NEURO: Alert and oriented.       MDM/Assessment/Plan:   Orders for this encounter:    Orders Placed This Encounter    POCT Rapid Strep    POCT Rapid Strep    amoxicillin 500 MG Oral Tab     Sig: Take 1 tablet (500 mg total) by mouth 2 (two) times daily for 10 days.     Dispense:  20 tablet     Refill:  0       Labs performed this visit:  Recent Results (from the past 10 hour(s))   POCT Rapid Strep    Collection Time: 05/13/24 10:31 AM   Result Value Ref Range    POCT Rapid Strep Positive (A) Negative       MDM:  39-year-old male here for sore throat for the last 2 days, rapid strep positive, results discussed with patient.  Increase fluids and rest, continue with Tylenol and ibuprofen at home.  Given prescription for amoxicillin.  Follow-up with primary care in 2 to 3 days and present to ER for new or worsening symptoms.  Patient agreeable to treatment  plan and follow-up, all questions answered prior to discharge.    Diagnosis:    ICD-10-CM    1. Streptococcal sore throat  J02.0           All results reviewed and discussed with patient.  See AVS for detailed discharge instructions for your condition today.    Follow Up with:  Nacho Lechuga MD  [address] [place] [address]  [address]  [facility] [address]  [phone]      Follow-up with primary care in 2 to 3 days and present to ER for new or worsening symptoms              NOTE TO PATIENT:  The 21st Century Cures Act makes medical notes like these available to patients in the interest of transparency. However, be advised this is a medical document. It is intended as peer to peer communication. It is written in medical language and may contain abbreviations or verbiage that are unfamiliar. It may appear blunt or direct. Medical documents are intended to carry relevant information, facts as evident, and the clinical opinion of the practitioner. .

## 2024-11-22 ENCOUNTER — OFFICE VISIT (OUTPATIENT)
Dept: FAMILY MEDICINE CLINIC | Facility: CLINIC | Age: 39
End: 2024-11-22
Payer: COMMERCIAL

## 2024-11-22 VITALS
HEART RATE: 86 BPM | SYSTOLIC BLOOD PRESSURE: 118 MMHG | RESPIRATION RATE: 18 BRPM | DIASTOLIC BLOOD PRESSURE: 72 MMHG | BODY MASS INDEX: 23.38 KG/M2 | OXYGEN SATURATION: 98 % | HEIGHT: 74 IN | WEIGHT: 182.19 LBS | TEMPERATURE: 98 F

## 2024-11-22 DIAGNOSIS — J01.00 ACUTE NON-RECURRENT MAXILLARY SINUSITIS: Primary | ICD-10-CM

## 2024-11-22 PROCEDURE — 99213 OFFICE O/P EST LOW 20 MIN: CPT | Performed by: PHYSICIAN ASSISTANT

## 2024-11-22 PROCEDURE — 3074F SYST BP LT 130 MM HG: CPT | Performed by: PHYSICIAN ASSISTANT

## 2024-11-22 PROCEDURE — 3008F BODY MASS INDEX DOCD: CPT | Performed by: PHYSICIAN ASSISTANT

## 2024-11-22 PROCEDURE — 3078F DIAST BP <80 MM HG: CPT | Performed by: PHYSICIAN ASSISTANT

## 2024-11-22 RX ORDER — FLUTICASONE PROPIONATE 50 MCG
1 SPRAY, SUSPENSION (ML) NASAL 2 TIMES DAILY
Qty: 1 EACH | Refills: 0 | Status: SHIPPED | OUTPATIENT
Start: 2024-11-22 | End: 2024-12-22

## 2024-11-22 RX ORDER — BENZONATATE 200 MG/1
200 CAPSULE ORAL 3 TIMES DAILY PRN
Qty: 30 CAPSULE | Refills: 0 | Status: SHIPPED | OUTPATIENT
Start: 2024-11-22 | End: 2024-12-02

## 2024-11-22 NOTE — PATIENT INSTRUCTIONS
Rest   Push fluids   Tylenol or ibuprofen OTC as needed for pain/fever   Zyrtec OTC once daily for nasal drainage  Flonase 1 spray each nostril twice daily for sinus pressure   Cough medication as needed   Please follow up with PCP if no improvement or if symptoms worsen

## 2024-11-22 NOTE — PROGRESS NOTES
CHIEF COMPLAINT:     Chief Complaint   Patient presents with    Cold     Headache, cough x4days  Congestion, sore throat, [id number]       HPI:   Rowdy Victoria is a 39 year old male who presents for cold symptoms for 3 weeks. Worsening nasal congestion, pressure, and cough over the past 4 days.  No fever. No body aches/chills. + headache. + nasal congestion. + sinus pressure. No ear pain. + sore throat. Productive cough. No chest pain or SOB. Hx of asthma- not bothering him at the moment. No GI symptoms. No covid at home testing. Taking No OTC medications. Nonsmoker       Current Outpatient Medications   Medication Sig Dispense Refill    benzonatate 200 MG Oral Cap Take 1 capsule (200 mg total) by mouth 3 (three) times daily as needed for cough. 30 capsule 0    amoxicillin clavulanate 875-125 MG Oral Tab Take 1 tablet by mouth 2 (two) times daily for 7 days. 14 tablet 0    fluticasone propionate 50 MCG/ACT Nasal Suspension 1 spray by Each Nare route in the morning and 1 spray before bedtime. 1 each 0    dicyclomine 10 MG Oral Cap Take 1 capsule (10 mg total) by mouth 3 (three) times daily as needed. 90 capsule 1    mesalamine 1.2 g Oral Tab EC Take 2 tablets (2.4 g total) by mouth daily. 180 tablet 3    tiZANidine 4 MG Oral Tab Take 1 tablet (4 mg total) by mouth 3 (three) times daily. (Patient not taking: Reported on 5/13/2024) 21 tablet 0    traMADol 50 MG Oral Tab Take 1 tablet (50 mg total) by mouth every 8 (eight) hours as needed for Pain. 10 tablet 0    lidocaine 4 % External Patch Place 1 patch onto the skin every 12 (twelve) hours. (Patient not taking: Reported on 5/13/2024) 10 patch 0    ipratropium-albuterol 0.5-2.5 (3) MG/3ML Inhalation Solution Take by nebulization 3 (three) times daily as needed. (Patient not taking: Reported on 5/13/2024)      budesonide 0.5 MG/2ML Inhalation Suspension USE 1 VIAL VIA NEBULIZER 2 TO 3 TIMES DAILY AS NEEDED      propranolol 10 MG Oral Tab Take 1 tablet (10 mg total)  by mouth daily as needed.      LORazepam 0.5 MG Oral Tab Take 1 tablet (0.5 mg total) by mouth every 4 (four) hours as needed for Anxiety.      divalproex Sodium  MG Oral Tablet 24 Hr TAKE 2 TABLETS BY MOUTH EVERY MORNING  6      Past Medical History:    ALLERGIC RHINITIS    ASTHMA    Irregular bowel habits    OTHER DISEASES    Bipolar disorder    UC (ulcerative colitis) ([facility])    Wears glasses      Past Surgical History:   Procedure Laterality Date    Colonoscopy      Create eardrum opening,gen anesth      Tonsillectomy        Family History   Problem Relation Age of Onset    Psychiatric Father         Bipolar    Colon Polyps Mother     Stroke Mother     Crohn's Disease Maternal Grandmother       Social History     Socioeconomic History    Marital status:    Tobacco Use    Smoking status: Former     Current packs/day: 0.00     Average packs/day: 1 pack/day for 3.0 years (3.0 ttl pk-yrs)     Types: Cigarettes     Start date: 1999     Quit date: 2002     Years since quittin.9     Passive exposure: Never    Smokeless tobacco: Never    Tobacco comments:     social smoker   Vaping Use    Vaping status: Never Used   Substance and Sexual Activity    Alcohol use: Yes    Drug use: No   Other Topics Concern    Seat Belt Yes         REVIEW OF SYSTEMS:   GENERAL:  denies  diminished appetite  SKIN: no rashes or abnormal skin lesions  HEENT: See HPI.    LUNGS: denies shortness of breath or wheezing, See HPI  CARDIOVASCULAR: denies chest pain or palpitations   GI: denies N/V/C or abdominal pain  NEURO: + sinus headaches.  No numbness or tingling in face.    EXAM:   /72   Pulse 86   Temp 97.7 °F (36.5 °C) (Oral)   Resp 18   Ht 6' 2\" (1.88 m)   Wt 182 lb 3.2 oz (82.6 kg)   SpO2 98%   BMI 23.39 kg/m²   Physical Exam  Constitutional:       General: He is not in acute distress.     Appearance: Normal appearance.   HENT:      Head: Normocephalic.      Right Ear: Tympanic membrane, ear canal and  external ear normal.      Left Ear: Tympanic membrane, ear canal and external ear normal.      Nose: Rhinorrhea present.      Mouth/Throat:      Mouth: Mucous membranes are moist.      Pharynx: Oropharynx is clear. Uvula midline. Posterior oropharyngeal erythema (post nasal drip) present.      Tonsils: No tonsillar exudate.   Eyes:      Conjunctiva/sclera: Conjunctivae normal.      Pupils: Pupils are equal, round, and reactive to light.   Cardiovascular:      Rate and Rhythm: Normal rate and regular rhythm.      Heart sounds: Normal heart sounds. No murmur heard.  Pulmonary:      Effort: Pulmonary effort is normal. No respiratory distress.      Breath sounds: Normal breath sounds. No wheezing or rhonchi.   Chest:      Chest wall: No tenderness.   Musculoskeletal:      Cervical back: Normal range of motion and neck supple.   Lymphadenopathy:      Cervical: No cervical adenopathy.   Skin:     General: Skin is warm.      Findings: No rash.   Neurological:      Mental Status: He is alert and oriented to person, place, and time.          No results found for this or any previous visit (from the past 24 hours).    ASSESSMENT AND PLAN:   Rowdy Victoria is a 39 year old male who presents with URI symptoms that are consistent with:      ASSESSMENT:  Encounter Diagnosis   Name Primary?    Acute non-recurrent maxillary sinusitis Yes         PLAN: Meds as below.  Comfort care instructions as listed in Patient Instructions    Meds & Refills for this Visit:  Requested Prescriptions     Signed Prescriptions Disp Refills    benzonatate 200 MG Oral Cap 30 capsule 0     Sig: Take 1 capsule (200 mg total) by mouth 3 (three) times daily as needed for cough.    amoxicillin clavulanate 875-125 MG Oral Tab 14 tablet 0     Sig: Take 1 tablet by mouth 2 (two) times daily for 7 days.    fluticasone propionate 50 MCG/ACT Nasal Suspension 1 each 0     Si spray by Each Nare route in the morning and 1 spray before bedtime.       Risks,  benefits, side effects of medication addressed and explained.    Patient Instructions   Rest   Push fluids   Tylenol or ibuprofen OTC as needed for pain/fever   Zyrtec OTC once daily for nasal drainage  Flonase 1 spray each nostril twice daily for sinus pressure   Cough medication as needed   Please follow up with PCP if no improvement or if symptoms worsen      The patient indicates understanding of these issues and agrees to the plan.  The patient is asked to f/u with PCP if sx's persist or worsen.

## 2024-11-23 ENCOUNTER — TELEPHONE (OUTPATIENT)
Dept: FAMILY MEDICINE CLINIC | Facility: CLINIC | Age: 39
End: 2024-11-23

## 2024-11-24 ENCOUNTER — TELEPHONE (OUTPATIENT)
Dept: FAMILY MEDICINE CLINIC | Facility: CLINIC | Age: 39
End: 2024-11-24

## 2024-11-24 NOTE — TELEPHONE ENCOUNTER
Pt called with a prescription question, call returned, no answer, left vm to return call with answering service number and clinic hours.

## 2024-12-16 ENCOUNTER — OFFICE VISIT (OUTPATIENT)
Dept: FAMILY MEDICINE CLINIC | Facility: CLINIC | Age: 39
End: 2024-12-16
Payer: COMMERCIAL

## 2024-12-16 DIAGNOSIS — T14.8XXA WOUND INFECTION: Primary | ICD-10-CM

## 2024-12-16 DIAGNOSIS — L08.9 WOUND INFECTION: Primary | ICD-10-CM

## 2024-12-16 PROBLEM — L03.90 WOUND CELLULITIS: Status: ACTIVE | Noted: 2024-12-16

## 2024-12-16 PROCEDURE — 87081 CULTURE SCREEN ONLY: CPT | Performed by: NURSE PRACTITIONER

## 2024-12-16 PROCEDURE — 99213 OFFICE O/P EST LOW 20 MIN: CPT | Performed by: NURSE PRACTITIONER

## 2024-12-16 RX ORDER — MUPIROCIN 20 MG/G
1 OINTMENT TOPICAL 3 TIMES DAILY
Qty: 1 EACH | Refills: 0 | Status: SHIPPED | OUTPATIENT
Start: 2024-12-16 | End: 2024-12-23

## 2024-12-16 RX ORDER — DOXYCYCLINE HYCLATE 100 MG
100 TABLET ORAL 2 TIMES DAILY
Qty: 14 TABLET | Refills: 0 | Status: SHIPPED | OUTPATIENT
Start: 2024-12-16 | End: 2024-12-23

## 2024-12-16 NOTE — PROGRESS NOTES
Subjective:   Patient ID: Rowdy Victoria is a 39 year old male.    Patient has a remote history of MRSA infection diagnosed by dermatologist and treated with oral antibiotics to the left elbow area.  Patient has 2 small subcentimeter scabs in the flexural surface of the left elbow.  Patient states 1 area was red and inflamed with puslike drainage.  That area has since resolved.  Patient noticed skin changes approximately 2 weeks ago.  Otherwise patient feels well, denies fever, chills, and flulike symptoms.  Denies wound streaking and drainage at this time.        History/Other:   Review of Systems   Constitutional:  Negative for chills and fever.   Skin:  Positive for wound.   All other systems reviewed and are negative.    Current Outpatient Medications   Medication Sig Dispense Refill    mupirocin 2 % External Ointment Apply 1 Application topically 3 (three) times daily for 7 days. 1 each 0    Doxycycline Hyclate 100 MG Oral Tab Take 1 tablet (100 mg total) by mouth 2 (two) times daily for 7 days. 14 tablet 0    fluticasone propionate 50 MCG/ACT Nasal Suspension 1 spray by Each Nare route in the morning and 1 spray before bedtime. 1 each 0    dicyclomine 10 MG Oral Cap Take 1 capsule (10 mg total) by mouth 3 (three) times daily as needed. 90 capsule 1    mesalamine 1.2 g Oral Tab EC Take 2 tablets (2.4 g total) by mouth daily. 180 tablet 3    tiZANidine 4 MG Oral Tab Take 1 tablet (4 mg total) by mouth 3 (three) times daily. (Patient not taking: Reported on 5/13/2024) 21 tablet 0    traMADol 50 MG Oral Tab Take 1 tablet (50 mg total) by mouth every 8 (eight) hours as needed for Pain. 10 tablet 0    lidocaine 4 % External Patch Place 1 patch onto the skin every 12 (twelve) hours. (Patient not taking: Reported on 5/13/2024) 10 patch 0    ipratropium-albuterol 0.5-2.5 (3) MG/3ML Inhalation Solution Take by nebulization 3 (three) times daily as needed. (Patient not taking: Reported on 5/13/2024)      budesonide  0.5 MG/2ML Inhalation Suspension USE 1 VIAL VIA NEBULIZER 2 TO 3 TIMES DAILY AS NEEDED      propranolol 10 MG Oral Tab Take 1 tablet (10 mg total) by mouth daily as needed.      LORazepam 0.5 MG Oral Tab Take 1 tablet (0.5 mg total) by mouth every 4 (four) hours as needed for Anxiety.      divalproex Sodium  MG Oral Tablet 24 Hr TAKE 2 TABLETS BY MOUTH EVERY MORNING  6     Allergies:Allergies[1]    Objective:   Physical Exam  Vitals and nursing note reviewed.   Constitutional:       Appearance: Normal appearance. He is not ill-appearing or toxic-appearing.   HENT:      Head: Normocephalic and atraumatic.      Nose: Nose normal. No congestion or rhinorrhea.      Mouth/Throat:      Mouth: Mucous membranes are moist.      Pharynx: Oropharynx is clear.   Eyes:      General: No scleral icterus.     Conjunctiva/sclera: Conjunctivae normal.   Cardiovascular:      Rate and Rhythm: Normal rate and regular rhythm.      Heart sounds: Normal heart sounds. No murmur heard.  Pulmonary:      Breath sounds: Normal breath sounds. No wheezing.   Skin:     General: Skin is warm and dry.      Findings: Wound present.             Comments: 2 small subcentimeter scabbed areas with no signs of infection.  MRSA culture collected.   Neurological:      Mental Status: He is alert and oriented to person, place, and time.   Psychiatric:         Mood and Affect: Mood normal.         Behavior: Behavior normal.         Assessment & Plan:   1. Wound infection        Orders Placed This Encounter   Procedures    MRSA Culture Only [E]     -Due to patient's remote history of MRSA infection, will treat areas of concern empirically with doxycycline.  Patient may use topical mupirocin cream as well during the course of oral antibiotics.  -Recommend patient plan to follow-up with dermatologist  -MRSA culture collected and routed to lab, will follow with results and recommendations  -If area worsens, has puslike drainage, wound streaking, or patient  experiences fever/chills, patient should report to higher level of care for reevaluation and treatment  -Patient verbalizes understanding and agrees with plan.  Questions answered.  Meds This Visit:  Requested Prescriptions     Signed Prescriptions Disp Refills    mupirocin 2 % External Ointment 1 each 0     Sig: Apply 1 Application topically 3 (three) times daily for 7 days.    Doxycycline Hyclate 100 MG Oral Tab 14 tablet 0     Sig: Take 1 tablet (100 mg total) by mouth 2 (two) times daily for 7 days.       Imaging & Referrals:  None         [1]   Allergies  Allergen Reactions    Tamsulosin DIZZINESS    No Known Allergies

## 2024-12-17 PROBLEM — L03.90 WOUND CELLULITIS: Status: RESOLVED | Noted: 2024-12-16 | Resolved: 2024-12-17

## 2025-01-21 NOTE — PROGRESS NOTES
CHIEF COMPLAINT:     Chief Complaint   Patient presents with    Sore Throat     Body aches, post nasal drip, cough X1 day        HPI:   Rowdy Victoria is a 39 year old male who presents for upper respiratory symptoms for  1 days. Patient reports cough, body aches and sore throat. Symptoms have been persisting since onset.  Treating symptoms with Tylenol, LD 3 hours ago.  Denies fever. Reports son with flu like symptoms about 1 week ago.   No home testing.     Current Outpatient Medications   Medication Sig Dispense Refill    albuterol 108 (90 Base) MCG/ACT Inhalation Aero Soln Inhale 2 puffs into the lungs every 6 (six) hours as needed for Wheezing. 1 each 0    benzonatate 200 MG Oral Cap Take 1 capsule (200 mg total) by mouth 3 (three) times daily as needed for cough. 15 capsule 0    mesalamine 1.2 g Oral Tab EC Take 2 tablets (2.4 g total) by mouth daily. 180 tablet 3    propranolol 10 MG Oral Tab Take 1 tablet (10 mg total) by mouth daily as needed.      LORazepam 0.5 MG Oral Tab Take 1 tablet (0.5 mg total) by mouth every 4 (four) hours as needed for Anxiety.      dicyclomine 10 MG Oral Cap Take 1 capsule (10 mg total) by mouth 3 (three) times daily as needed. (Patient not taking: Reported on 1/21/2025) 90 capsule 1    tiZANidine 4 MG Oral Tab Take 1 tablet (4 mg total) by mouth 3 (three) times daily. (Patient not taking: Reported on 1/21/2025) 21 tablet 0    traMADol 50 MG Oral Tab Take 1 tablet (50 mg total) by mouth every 8 (eight) hours as needed for Pain. (Patient not taking: Reported on 1/21/2025) 10 tablet 0    lidocaine 4 % External Patch Place 1 patch onto the skin every 12 (twelve) hours. (Patient not taking: Reported on 1/21/2025) 10 patch 0    ipratropium-albuterol 0.5-2.5 (3) MG/3ML Inhalation Solution Take by nebulization 3 (three) times daily as needed. (Patient not taking: Reported on 1/21/2025)      budesonide 0.5 MG/2ML Inhalation Suspension USE 1 VIAL VIA NEBULIZER 2 TO 3 TIMES DAILY AS  NEEDED (Patient not taking: Reported on 2025)      divalproex Sodium  MG Oral Tablet 24 Hr TAKE 2 TABLETS BY MOUTH EVERY MORNING (Patient not taking: Reported on 2025)  6      Past Medical History:    ALLERGIC RHINITIS    ASTHMA    Irregular bowel habits    OTHER DISEASES    Bipolar disorder    UC (ulcerative colitis) ([facility])    Wears glasses      Past Surgical History:   Procedure Laterality Date    Colonoscopy      Create eardrum opening,gen anesth      Tonsillectomy           Social History     Socioeconomic History    Marital status:    Tobacco Use    Smoking status: Former     Current packs/day: 0.00     Average packs/day: 1 pack/day for 3.0 years (3.0 ttl pk-yrs)     Types: Cigarettes     Start date: 1999     Quit date: 2002     Years since quittin.0     Passive exposure: Never    Smokeless tobacco: Never    Tobacco comments:     social smoker   Vaping Use    Vaping status: Never Used   Substance and Sexual Activity    Alcohol use: Yes    Drug use: No   Other Topics Concern    Seat Belt Yes         REVIEW OF SYSTEMS:   GENERAL: see HPI  SKIN: no rashes or abnormal skin lesions  HEENT: See HPI  LUNGS: denies shortness of breath or wheezing. Patient reports he \"feels tight\". He has used nebulizer with Duoneb and budesonide in the past when he gets sick. Reports recent move and does not have neb machine.  See HPI  CARDIOVASCULAR: denies chest pain or palpitations   GI: denies N/V/C or abdominal pain      EXAM:   /48   Pulse 87   Temp 99.5 °F (37.5 °C) (Tympanic)   Resp 16   Ht 6' 2\" (1.88 m)   Wt 190 lb (86.2 kg)   SpO2 97%   BMI 24.39 kg/m²   GENERAL: well nourished,in no apparent distress  SKIN: no rashes,no suspicious lesions  HEAD: atraumatic, normocephalic.    EYES: conjunctiva clear, EOM intact  EARS: TM's gray, no bulging, no retraction,no fluid, bony landmarks intact  NOSE: Nostrils patent, clear nasal discharge  THROAT: Oral mucosa pink, moist. Posterior  pharynx is not erythematous. no exudates. Tonsils 0/4.    NECK: Supple, non-tender  LUNGS: clear to auscultation bilaterally. Breathing is non labored. Dry cough noted. No wheezing or labored breathing.   CARDIO: RRR without murmur  LYMPH:  no cervical lymphadenopathy.        ASSESSMENT AND PLAN:   Rowdy Victoria is a 39 year old male who presents with     ASSESSMENT:   Encounter Diagnosis   Name Primary?    Influenza-like illness Yes       PLAN:   Rapid flu negative  Discussed covid testing available.   Meds as below.  Comfort care as described in Patient Instructions  Discussed interactions with propanolol and albuterol. Discussed exam findings at this time no wheezing.   Discussed viral vs bacterial etiology of URIs. Patient was informed that antibiotics are not effective for treating viral ailments and can result in antibiotic resistance. Reviewed symptom relief measures with patient.       Meds & Refills for this Visit:  Requested Prescriptions     Signed Prescriptions Disp Refills    albuterol 108 (90 Base) MCG/ACT Inhalation Aero Soln 1 each 0     Sig: Inhale 2 puffs into the lungs every 6 (six) hours as needed for Wheezing.    benzonatate 200 MG Oral Cap 15 capsule 0     Sig: Take 1 capsule (200 mg total) by mouth 3 (three) times daily as needed for cough.       Risks, benefits, and side effects of medication explained and discussed.  The patient indicates understanding of these issues and agrees to the plan.

## 2025-05-17 NOTE — PROGRESS NOTES
CHIEF COMPLAINT:     Chief Complaint   Patient presents with    Sinus Problem     X2weeks headache, sinus pressure, congestion  X3days body aches       HPI:   Rowdy Victoria is a 40 year old male who presents for upper respiratory symptoms for  3 days. Patient reports sore throat only at the beginning of sx's, congestion, green colored nasal discharge, cough with green colored sputum, cough is keeping pt up at night, ear pain, sinus pain, prior history of sinusitis, denies fever. Symptoms have been lingering since onset.  Treating symptoms with sudafed, Flonase and Zyrtec. Cough in the morning and night large amounts of drainage and post nasal drip. Sore throat resolved. NO home viral testing. Pt report that he has a sore neck, or back of head area, denies fever, denies rash.  Denies shortness of breath, difficulty breathing, and chest pain.    Current Medications[1]   Past Medical History[2]   Past Surgical History[3]      Short Social Hx on File[4]      REVIEW OF SYSTEMS:   Review of Systems   Constitutional:  Negative for chills and fever.   HENT:  Positive for congestion, ear pain, postnasal drip, rhinorrhea, sinus pressure and sinus pain. Negative for ear discharge, sore throat and trouble swallowing.    Eyes:  Negative for discharge and redness.   Respiratory:  Positive for cough. Negative for chest tightness, shortness of breath and wheezing.    Gastrointestinal:  Negative for nausea.   Musculoskeletal:  Positive for myalgias and neck pain.   Skin:  Negative for rash.   Neurological:  Positive for headaches.          EXAM:   /88   Pulse 77   Temp 98.4 °F (36.9 °C) (Temporal)   Resp 20   Ht 6' 1\" (1.854 m)   Wt 185 lb (83.9 kg)   SpO2 95%   BMI 24.41 kg/m²   Physical Exam  Vitals and nursing note reviewed.   Constitutional:       Appearance: Normal appearance. He is not ill-appearing.   HENT:      Head: Normocephalic and atraumatic.      Right Ear: Hearing, ear canal and external ear normal.  No drainage. There is no impacted cerumen. Tympanic membrane is injected. Tympanic membrane is not perforated, erythematous or bulging.      Left Ear: Hearing, tympanic membrane, ear canal and external ear normal. No drainage. There is no impacted cerumen. Tympanic membrane is not injected, perforated, erythematous or bulging.      Nose: Mucosal edema, congestion and rhinorrhea present.      Right Sinus: Maxillary sinus tenderness and frontal sinus tenderness present.      Left Sinus: Maxillary sinus tenderness and frontal sinus tenderness present.      Mouth/Throat:      Lips: No lesions.      Mouth: Mucous membranes are moist. No oral lesions.      Palate: No lesions.      Pharynx: Oropharynx is clear. Uvula midline. Uvula swelling present. No oropharyngeal exudate or posterior oropharyngeal erythema.      Tonsils: No tonsillar exudate or tonsillar abscesses. 0 on the right. 0 on the left.        Comments: Patient's uvula is slightly red and inflamed, nonobstructive.  Patient states at times his uvula can be quite enlarged and inflamed.  Currently denies trouble swallowing.  Currently denies pain.  No uvula deviation noted, no palatal swelling.  Eyes:      General: No scleral icterus.        Right eye: No discharge.         Left eye: No discharge.      Conjunctiva/sclera: Conjunctivae normal.   Cardiovascular:      Rate and Rhythm: Normal rate and regular rhythm.      Heart sounds: Normal heart sounds. No murmur heard.  Pulmonary:      Effort: Pulmonary effort is normal. No tachypnea, accessory muscle usage, respiratory distress or retractions.      Breath sounds: Examination of the right-middle field reveals wheezing. Examination of the left-middle field reveals wheezing. Wheezing present.      Comments: Intermittent wheeze clears with cough  Lymphadenopathy:      Cervical: No cervical adenopathy.      Right cervical: No superficial or posterior cervical adenopathy.     Left cervical: No superficial or posterior  cervical adenopathy.   Skin:     General: Skin is warm and dry.   Neurological:      Mental Status: He is alert and oriented to person, place, and time.   Psychiatric:         Mood and Affect: Mood normal.         Behavior: Behavior normal.           ASSESSMENT AND PLAN:   Rowdy Victoria is a 40 year old male who presents with upper respiratory symptoms that are consistent with    ASSESSMENT:   Encounter Diagnoses   Name Primary?    Rhinosinusitis Yes    Upper respiratory infection with cough and congestion     Neck muscle strain, initial encounter        PLAN: Patient has required albuterol inhalers in the past, patient has slight intermittent wheezing, provided short course of oral steroid.  Due to continued sinus pain and tenderness with large amounts of drainage, will treat for bacterial sinus infection with Augmentin.  Encourage patient to continue Flonase and Zyrtec.  Patient may use Tylenol as needed for discomfort.  Encourage patient to push fluids and rest.  Meds as below.  Comfort care as described in Patient Instructions.    Meds & Refills for this Visit:  Requested Prescriptions     Signed Prescriptions Disp Refills    amoxicillin clavulanate 875-125 MG Oral Tab 14 tablet 0     Sig: Take 1 tablet by mouth 2 (two) times daily for 7 days.    predniSONE 20 MG Oral Tab 10 tablet 0     Sig: Take 2 tablets (40 mg total) by mouth daily for 5 days.     Risks, benefits, and side effects of medication explained and discussed.    The patient indicates understanding of these issues and agrees to the plan.  The patient is asked to f/u with PCP if sx's persist.  If patient experiences increased neck pain, new fever, and/or rash, report to nearest emergency room for prompt reevaluation and treatment.  If patient experiences shortness of breath, difficulty breathing, and/or chest pain report to nearest emergency room for prompt reevaluation and treatment.  If patient experiences uvula swelling or difficulty  swallowing or drooling report to nearest emergency room for prompt reevaluation and treatment.           [1]   Current Outpatient Medications   Medication Sig Dispense Refill    amoxicillin clavulanate 875-125 MG Oral Tab Take 1 tablet by mouth 2 (two) times daily for 7 days. 14 tablet 0    predniSONE 20 MG Oral Tab Take 2 tablets (40 mg total) by mouth daily for 5 days. 10 tablet 0    albuterol 108 (90 Base) MCG/ACT Inhalation Aero Soln Inhale 2 puffs into the lungs every 6 (six) hours as needed for Wheezing. 1 each 0    dicyclomine 10 MG Oral Cap Take 1 capsule (10 mg total) by mouth 3 (three) times daily as needed. (Patient not taking: Reported on 1/21/2025) 90 capsule 1    mesalamine 1.2 g Oral Tab EC Take 2 tablets (2.4 g total) by mouth daily. 180 tablet 3    tiZANidine 4 MG Oral Tab Take 1 tablet (4 mg total) by mouth 3 (three) times daily. (Patient not taking: Reported on 1/21/2025) 21 tablet 0    traMADol 50 MG Oral Tab Take 1 tablet (50 mg total) by mouth every 8 (eight) hours as needed for Pain. (Patient not taking: Reported on 1/21/2025) 10 tablet 0    lidocaine 4 % External Patch Place 1 patch onto the skin every 12 (twelve) hours. (Patient not taking: Reported on 1/21/2025) 10 patch 0    ipratropium-albuterol 0.5-2.5 (3) MG/3ML Inhalation Solution Take by nebulization 3 (three) times daily as needed. (Patient not taking: Reported on 1/21/2025)      budesonide 0.5 MG/2ML Inhalation Suspension USE 1 VIAL VIA NEBULIZER 2 TO 3 TIMES DAILY AS NEEDED (Patient not taking: Reported on 1/21/2025)      propranolol 10 MG Oral Tab Take 1 tablet (10 mg total) by mouth daily as needed.      LORazepam 0.5 MG Oral Tab Take 1 tablet (0.5 mg total) by mouth every 4 (four) hours as needed for Anxiety.      divalproex Sodium  MG Oral Tablet 24 Hr TAKE 2 TABLETS BY MOUTH EVERY MORNING (Patient not taking: Reported on 1/21/2025)  6   [2]   Past Medical History:   ALLERGIC RHINITIS    ASTHMA    Irregular bowel habits     OTHER DISEASES    Bipolar disorder    UC (ulcerative colitis) ([facility])    Wears glasses   [3]   Past Surgical History:  Procedure Laterality Date    Colonoscopy      Create eardrum opening,gen anesth      Tonsillectomy     [4]   Social History  Socioeconomic History    Marital status:    Tobacco Use    Smoking status: Former     Current packs/day: 0.00     Average packs/day: 1 pack/day for 3.0 years (3.0 ttl pk-yrs)     Types: Cigarettes     Start date: 1999     Quit date: 2002     Years since quittin.3     Passive exposure: Never    Smokeless tobacco: Never    Tobacco comments:     social smoker   Vaping Use    Vaping status: Never Used   Substance and Sexual Activity    Alcohol use: Yes    Drug use: No   Other Topics Concern    Seat Belt Yes

## (undated) NOTE — LETTER
19    Patient: Flor Ashley  : 1985 Visit date: 2019    Dear  Dr. Arianne Santiago MD,    Thank you for referring Flor Ashley to my practice. Please find my assessment and plan below.                 Assessment   Grade III internal hemorrhoi circumferential grade 3 internal hemorrhoids. There is no evidence of any proctitis, ulcerative colitis, or Crohn's disease. At this time I recommend rubber band treatments for the patient's internal hemorrhoids.   The patient will be scheduled for 4 vi

## (undated) NOTE — LETTER
19    Patient: Neal Cummings  : 1985 Visit date: 2019    Dear  Dr. Catrachita Osborn MD,    Thank you for referring Neal Cummings to my practice. Please find my assessment and plan below.                 Assessment   Grade III internal hemorrhoi circumferential grade 3 internal hemorrhoids. There is no evidence of any proctitis, ulcerative colitis, or Crohn's disease. At this time I recommend rubber band treatments for the patient's internal hemorrhoids.   The patient will be scheduled for 4 vi

## (undated) NOTE — LETTER
19    Patient: Param Cuellar  : 1985 Visit date: 6/3/2019    Dear  Dr. Akira Goel MD,    Thank you for referring Param Cuellar to my practice. Please find my assessment and plan below.          Assessment   Grade III internal hemorrhoids  (pr